# Patient Record
(demographics unavailable — no encounter records)

---

## 2024-11-25 NOTE — PHYSICAL EXAM
[Alert] : alert [Oriented x 3] : ~L oriented x 3 [Well Nourished] : well nourished [Conjunctiva Non-injected] : conjunctiva non-injected [No Visual Lymphadenopathy] : no visual  lymphadenopathy [No Clubbing] : no clubbing [No Edema] : no edema [No Bromhidrosis] : no bromhidrosis [No Chromhidrosis] : no chromhidrosis [FreeTextEntry3] : The patient is well-appearing, in no acute distress, alert and oriented x 3. Mood and affect are normal. A complete cutaneous examination of the scalp, face, neck, chest, abdomen, back, bilateral arms, bilateral legs, buttocks, digits, nails, eyelids, conjunctiva and lips reveals the following significant findings: -Tan to dark brown macules on the trunk and extremities with no concerning dermoscopic features  -Erythematous papules around the chin and jawline

## 2024-11-25 NOTE — HISTORY OF PRESENT ILLNESS
[FreeTextEntry1] : acne; moles [de-identified] : 34F with no personal hx of skin cancer here for  1) acne - present on the face. flares with menses. Using OTC products which irritate her skin 2) mole check

## 2025-02-06 NOTE — PHYSICAL EXAM
[No Edema] : there was no peripheral edema [Normal] : normal gait, coordination grossly intact, no focal deficits and deep tendon reflexes were 2+ and symmetric [Alert and Oriented x3] : oriented to person, place, and time [Normal Insight/Judgement] : insight and judgment were intact [de-identified] : tearful

## 2025-02-06 NOTE — HISTORY OF PRESENT ILLNESS
[FreeTextEntry1] : new patient exam [de-identified] : 34 year old female hx seasonal allergies, presents for new patient exam. She is concerned about anxiety and referral therapist. She has mild sore throat today with mild cough, has stuffy nose with dried blood. she uses saline nasal spray and occasionally uses flonase. Denies fever, chills, SOB, wheezing, chest pain.  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 Since she had her baby 1.5 years ago, and then quitting her job a couple months ago, her anxiety is now worse.  She complains of poor short term memory, has to write things down to remember, irritability, and cutting off her 's sentences. She also feels very stressed about the current political climate and how it will affect her family. She is currently applying for jobs, and has stress for her future employment. She is not interested in starting anxiety medication at this time. She never used medication or had a therapist in the past, She wants a therapist now. she denies having ADHD symptoms as a teenager or in adult life. ROEL 7 : 12, moderate  PHQ2: 1  MHx: seasonal allergies,  in  SHx: denies Meds: flonase  Allergies: penicillin, rash as a child  FHx: mother HLD, asthma. Brother HLD. Grandfather liver cirrhosis. father cholecystectomy. aunts HTN Pap smear: 2025, normal. follows with GYN. She has right breast pain in 2025, had breast US which was normal.   Diet: sometimes skips meals, eats chicken, vegetables, carb. Lives with  and baby, feels safe at home. SAD; Denies tobacco use, rarely alcohol use, denies drug use.  Did not have Flu vaccine this year. Had TDap during pregnancy, MMR after pregnancy. She is asking for MMR titres to check if she has immunity

## 2025-02-06 NOTE — HEALTH RISK ASSESSMENT
[No falls in past year] : Patient reported no falls in the past year [Never] : Never [With Significant Other] : lives with significant other [Unemployed] : unemployed [1] : 1) Little interest or pleasure doing things for several days (1) [0] : 2) Feeling down, depressed, or hopeless: Not at all (0) [PHQ-2 Negative - No further assessment needed] : PHQ-2 Negative - No further assessment needed [Patient reported PAP Smear was normal] : Patient reported PAP Smear was normal [Yes] : Yes [1 or 2 (0 pts)] : 1 or 2 (0 points) [Less than monthly (1 pt)] : Less than monthly (1 point) [[id number]] : 1 [PapSmearDate] : 01/25

## 2025-02-06 NOTE — PLAN
[FreeTextEntry1] : # HCM - vitals stable - FU labs - UTD Pap smear - declines Flu vaccine - UTD tetanus vaccine and MMR - FU MMR titres  # Anxiety - pt feels anxiety about her job situation, balancing having a baby, and the state of national politics. - ROEL 12 - referral to therapist - pt can follow up with Neuropsych if she wants ADHD evaluation

## 2025-02-06 NOTE — REVIEW OF SYSTEMS
[Nosebleed] : nosebleed [Nasal Discharge] : nasal discharge [Sore Throat] : sore throat [Anxiety] : anxiety [Cough] : cough [Negative] : Cardiovascular [Earache] : no earache [Hearing Loss] : no hearing loss [Shortness Of Breath] : no shortness of breath [Wheezing] : no wheezing [Suicidal] : not suicidal [Insomnia] : no insomnia [Depression] : no depression